# Patient Record
Sex: MALE | URBAN - NONMETROPOLITAN AREA
[De-identification: names, ages, dates, MRNs, and addresses within clinical notes are randomized per-mention and may not be internally consistent; named-entity substitution may affect disease eponyms.]

---

## 2022-03-21 ENCOUNTER — APPOINTMENT (OUTPATIENT)
Age: 77
Setting detail: DERMATOLOGY
End: 2022-03-21

## 2022-03-21 DIAGNOSIS — Z71.89 OTHER SPECIFIED COUNSELING: ICD-10-CM

## 2022-03-21 DIAGNOSIS — L29.8 OTHER PRURITUS: ICD-10-CM

## 2022-03-21 PROBLEM — C44.529 SQUAMOUS CELL CARCINOMA OF SKIN OF OTHER PART OF TRUNK: Status: ACTIVE | Noted: 2022-03-21

## 2022-03-21 PROBLEM — C44.42 SQUAMOUS CELL CARCINOMA OF SKIN OF SCALP AND NECK: Status: ACTIVE | Noted: 2022-03-21

## 2022-03-21 PROCEDURE — 99202 OFFICE O/P NEW SF 15 MIN: CPT | Mod: 25

## 2022-03-21 PROCEDURE — 11102 TANGNTL BX SKIN SINGLE LES: CPT

## 2022-03-21 PROCEDURE — 11103 TANGNTL BX SKIN EA SEP/ADDL: CPT

## 2022-03-21 PROCEDURE — OTHER MIPS QUALITY: OTHER

## 2022-03-21 PROCEDURE — OTHER BIOPSY BY SHAVE METHOD: OTHER

## 2022-03-21 PROCEDURE — OTHER COUNSELING: OTHER

## 2022-03-21 PROCEDURE — OTHER PATIENT SPECIFIC COUNSELING: OTHER

## 2022-03-21 ASSESSMENT — LOCATION DETAILED DESCRIPTION DERM
LOCATION DETAILED: RIGHT DISTAL DORSAL FOREARM
LOCATION DETAILED: LEFT PROXIMAL DORSAL FOREARM

## 2022-03-21 ASSESSMENT — LOCATION SIMPLE DESCRIPTION DERM
LOCATION SIMPLE: RIGHT FOREARM
LOCATION SIMPLE: LEFT FOREARM

## 2022-03-21 ASSESSMENT — LOCATION ZONE DERM: LOCATION ZONE: ARM

## 2022-03-21 NOTE — PROCEDURE: MIPS QUALITY
Quality 130: Documentation Of Current Medications In The Medical Record: Current Medications Documented
Quality 431: Preventive Care And Screening: Unhealthy Alcohol Use - Screening: Patient not identified as an unhealthy alcohol user when screened for unhealthy alcohol use using a systematic screening method
Quality 265: Biopsy Follow-Up: Biopsy results reviewed, communicated, tracked, and documented
Quality 226: Preventive Care And Screening: Tobacco Use: Screening And Cessation Intervention: Patient screened for tobacco use and is an ex/non-smoker
Detail Level: Detailed

## 2022-03-21 NOTE — PROCEDURE: BIOPSY BY SHAVE METHOD
Additional Anesthesia Volume In Cc (Will Not Render If 0): 0
Validate That Anesthesia Is Not 0: No
Biopsy Method: Dermablade
Was A Bandage Applied: Yes
Depth Of Biopsy: epidermis
Biopsy Type: H and E
Billing Type: Third-Party Bill
Silver Nitrate Text: The wound bed was treated with silver nitrate after the biopsy was performed.
Anesthesia Volume In Cc (Will Not Render If 0): 0.5
Hemostasis: Drysol
Electrodesiccation And Curettage Text: The wound bed was treated with electrodesiccation and curettage after the biopsy was performed.
Detail Level: Detailed
Type Of Destruction Used: Curettage
Wound Care: Petrolatum
Consent: Written consent was obtained and risks were reviewed including but not limited to scarring, infection, bleeding, scabbing, incomplete removal, nerve damage and allergy to anesthesia.
Electrodesiccation Text: The wound bed was treated with electrodesiccation after the biopsy was performed.
Notification Instructions: Patient will be notified of biopsy results. However, patient instructed to call the office if not contacted within 2 weeks.
Anesthesia Type: 1% lidocaine with epinephrine
X Size Of Lesion In Cm: 2
X Size Of Lesion In Cm: 1.5
Cryotherapy Text: The wound bed was treated with cryotherapy after the biopsy was performed.
Information: Selecting Yes will display possible errors in your note based on the variables you have selected. This validation is only offered as a suggestion for you. PLEASE NOTE THAT THE VALIDATION TEXT WILL BE REMOVED WHEN YOU FINALIZE YOUR NOTE. IF YOU WANT TO FAX A PRELIMINARY NOTE YOU WILL NEED TO TOGGLE THIS TO 'NO' IF YOU DO NOT WANT IT IN YOUR FAXED NOTE.
Post-Care Instructions: I reviewed with the patient in detail post-care instructions. Patient is to keep the biopsy site dry overnight, and then apply bacitracin twice daily until healed. Patient may apply hydrogen peroxide soaks to remove any crusting.
Size Of Lesion In Cm: 3
Dressing: bandage
Curettage Text: The wound bed was treated with curettage after the biopsy was performed.

## 2022-03-21 NOTE — PROCEDURE: PATIENT SPECIFIC COUNSELING
Recommended cerave moisturizing cream\\nRecommended dove white bar unscented soap \\nRecommended all free and clear detergent \\nRecommended OTC Sarna lotion\\nRecommended not using dryer sheets\\n\\nIf itchy, ok to Apply dexsoximetasone twice daily to arms x two weeks, rest a week, may repeat. Try to rely on Sarna more than the steroid as the steroid can thin the skin\\n\\nExplained itching can be caused by hundreds of things: medications, allergens, and even occult malignancies
Detail Level: Detailed

## 2022-05-03 ENCOUNTER — APPOINTMENT (OUTPATIENT)
Dept: URBAN - METROPOLITAN AREA CLINIC 272 | Age: 77
Setting detail: DERMATOLOGY
End: 2022-05-03

## 2022-05-03 PROBLEM — C44.42 SQUAMOUS CELL CARCINOMA OF SKIN OF SCALP AND NECK: Status: ACTIVE | Noted: 2022-05-03

## 2022-05-03 PROCEDURE — OTHER MOHS SURGERY: OTHER

## 2022-05-03 PROCEDURE — 14021 TIS TRNFR S/A/L 10.1-30 SQCM: CPT

## 2022-05-03 PROCEDURE — 17312 MOHS ADDL STAGE: CPT

## 2022-05-03 PROCEDURE — 17311 MOHS 1 STAGE H/N/HF/G: CPT

## 2022-05-03 NOTE — PROCEDURE: MOHS SURGERY
Department of Anesthesiology  Postprocedure Note    Patient: Priti Lama  MRN: [id number]  [place]: 1955  Date of evaluation: 5/6/2021  Time:  9:09 AM     Procedure Summary     Date: 05/06/21 Room / Location: [facility] ENDO 02 / [address] [place] [address]    Anesthesia Start: 2284 Anesthesia Stop:     Procedure: COLONOSCOPY POLYPECTOMY SNARE/COLD BIOPSY (N/A Abdomen) Diagnosis: (SCREEN)    Surgeons: Phill Almodovar MD Responsible Provider: JEVON Cárdenas CRNA    Anesthesia Type: general, TIVA ASA Status: 3          Anesthesia Type: No value filed. Femi Phase I:      Femi Phase II:      Last vitals: Reviewed and per EMR flowsheets.        Anesthesia Post Evaluation    Patient location during evaluation: bedside  Patient participation: complete - patient participated  Level of consciousness: sleepy but conscious  Pain score: 0  Airway patency: patent  Nausea & Vomiting: no nausea and no vomiting  Complications: no  Cardiovascular status: hemodynamically stable and blood pressure returned to baseline  Respiratory status: acceptable and nasal cannula  Hydration status: stable Double O-Z Plasty Text: The defect edges were debeveled with a #15 scalpel blade.  Given the location of the defect, shape of the defect and the proximity to free margins a Double O-Z plasty (double transposition flap) was deemed most appropriate.  Using a sterile surgical marker, the appropriate transposition flaps were drawn incorporating the defect and placing the expected incisions within the relaxed skin tension lines where possible. The area thus outlined was incised deep to adipose tissue with a #15 scalpel blade.  The skin margins were undermined to an appropriate distance in all directions utilizing iris scissors.  Hemostasis was achieved with electrocautery.  The flaps were then transposed into place, one clockwise and the other counterclockwise, and anchored with interrupted buried subcutaneous sutures.

## 2022-10-18 NOTE — PROCEDURE: MOHS SURGERY
Double Island Pedicle Flap Text: The defect edges were debeveled with a #15 scalpel blade.  Given the location of the defect, shape of the defect and the proximity to free margins a double island pedicle advancement flap was deemed most appropriate.  Using a sterile surgical marker, an appropriate advancement flap was drawn incorporating the defect, outlining the appropriate donor tissue and placing the expected incisions within the relaxed skin tension lines where possible.    The area thus outlined was incised deep to adipose tissue with a #15 scalpel blade.  The skin margins were undermined to an appropriate distance in all directions around the primary defect and laterally outward around the island pedicle utilizing iris scissors.  There was minimal undermining beneath the pedicle flap. H Plasty Text: Given the location of the defect, shape of the defect and the proximity to free margins a H-plasty was deemed most appropriate for repair.  Using a sterile surgical marker, the appropriate advancement arms of the H-plasty were drawn incorporating the defect and placing the expected incisions within the relaxed skin tension lines where possible. The area thus outlined was incised deep to adipose tissue with a #15 scalpel blade. The skin margins were undermined to an appropriate distance in all directions utilizing iris scissors.  The opposing advancement arms were then advanced into place in opposite direction and anchored with interrupted buried subcutaneous sutures.